# Patient Record
Sex: MALE | Race: WHITE | NOT HISPANIC OR LATINO | Employment: FULL TIME | URBAN - METROPOLITAN AREA
[De-identification: names, ages, dates, MRNs, and addresses within clinical notes are randomized per-mention and may not be internally consistent; named-entity substitution may affect disease eponyms.]

---

## 2017-08-03 ENCOUNTER — HOSPITAL ENCOUNTER (OUTPATIENT)
Facility: MEDICAL CENTER | Age: 37
End: 2017-08-03
Attending: PHYSICIAN ASSISTANT
Payer: COMMERCIAL

## 2017-08-03 LAB
ALBUMIN SERPL BCP-MCNC: 4.7 G/DL (ref 3.2–4.9)
ALP SERPL-CCNC: 84 U/L (ref 30–99)
ALT SERPL-CCNC: 15 U/L (ref 2–50)
AST SERPL-CCNC: 18 U/L (ref 12–45)
BILIRUB CONJ SERPL-MCNC: 0.1 MG/DL (ref 0.1–0.5)
BILIRUB INDIRECT SERPL-MCNC: 0.5 MG/DL (ref 0–1)
BILIRUB SERPL-MCNC: 0.6 MG/DL (ref 0.1–1.5)
FSH SERPL-ACNC: 3.5 MIU/ML (ref 1.5–12.4)
HCT VFR BLD AUTO: 48.1 % (ref 42–52)
HGB BLD-MCNC: 15.8 G/DL (ref 14–18)
LH SERPL-ACNC: 5 IU/L (ref 1.7–8.6)
PROLACTIN SERPL-MCNC: 7.27 NG/ML (ref 2.1–17.7)
PROT SERPL-MCNC: 7.5 G/DL (ref 6–8.2)
TESTOST SERPL-MCNC: 309 NG/DL (ref 175–781)

## 2017-08-03 PROCEDURE — 84146 ASSAY OF PROLACTIN: CPT

## 2017-08-03 PROCEDURE — 80076 HEPATIC FUNCTION PANEL: CPT

## 2017-08-03 PROCEDURE — 84403 ASSAY OF TOTAL TESTOSTERONE: CPT

## 2017-08-03 PROCEDURE — 85018 HEMOGLOBIN: CPT

## 2017-08-03 PROCEDURE — 83001 ASSAY OF GONADOTROPIN (FSH): CPT

## 2017-08-03 PROCEDURE — 83002 ASSAY OF GONADOTROPIN (LH): CPT

## 2017-08-03 PROCEDURE — 85014 HEMATOCRIT: CPT

## 2018-02-05 ENCOUNTER — HOSPITAL ENCOUNTER (OUTPATIENT)
Facility: MEDICAL CENTER | Age: 38
End: 2018-02-05
Attending: PHYSICIAN ASSISTANT
Payer: COMMERCIAL

## 2018-02-05 LAB
ALBUMIN SERPL BCP-MCNC: 4.7 G/DL (ref 3.2–4.9)
ALP SERPL-CCNC: 85 U/L (ref 30–99)
ALT SERPL-CCNC: 19 U/L (ref 2–50)
AST SERPL-CCNC: 21 U/L (ref 12–45)
BILIRUB CONJ SERPL-MCNC: 0.1 MG/DL (ref 0.1–0.5)
BILIRUB INDIRECT SERPL-MCNC: 0.6 MG/DL (ref 0–1)
BILIRUB SERPL-MCNC: 0.7 MG/DL (ref 0.1–1.5)
HCT VFR BLD AUTO: 46.3 % (ref 42–52)
HGB BLD-MCNC: 15.7 G/DL (ref 14–18)
PROT SERPL-MCNC: 7.1 G/DL (ref 6–8.2)
TESTOST SERPL-MCNC: 292 NG/DL (ref 175–781)

## 2018-02-05 PROCEDURE — 84403 ASSAY OF TOTAL TESTOSTERONE: CPT

## 2018-02-05 PROCEDURE — 85018 HEMOGLOBIN: CPT

## 2018-02-05 PROCEDURE — 80076 HEPATIC FUNCTION PANEL: CPT

## 2018-02-05 PROCEDURE — 85014 HEMATOCRIT: CPT

## 2019-11-06 ENCOUNTER — OFFICE VISIT (OUTPATIENT)
Dept: URGENT CARE | Facility: CLINIC | Age: 39
End: 2019-11-06
Payer: COMMERCIAL

## 2019-11-06 VITALS
BODY MASS INDEX: 28.99 KG/M2 | SYSTOLIC BLOOD PRESSURE: 104 MMHG | RESPIRATION RATE: 14 BRPM | DIASTOLIC BLOOD PRESSURE: 78 MMHG | WEIGHT: 214 LBS | TEMPERATURE: 99.6 F | HEIGHT: 72 IN | OXYGEN SATURATION: 96 % | HEART RATE: 66 BPM

## 2019-11-06 DIAGNOSIS — J04.0 LARYNGITIS: ICD-10-CM

## 2019-11-06 LAB
INT CON NEG: NORMAL
INT CON POS: NORMAL
S PYO AG THROAT QL: NEGATIVE

## 2019-11-06 PROCEDURE — 87880 STREP A ASSAY W/OPTIC: CPT | Performed by: PHYSICIAN ASSISTANT

## 2019-11-06 PROCEDURE — 99203 OFFICE O/P NEW LOW 30 MIN: CPT | Performed by: PHYSICIAN ASSISTANT

## 2019-11-06 RX ORDER — AZITHROMYCIN 250 MG/1
TABLET, FILM COATED ORAL
Qty: 6 TAB | Refills: 0 | Status: SHIPPED | OUTPATIENT
Start: 2019-11-06

## 2019-11-06 ASSESSMENT — ENCOUNTER SYMPTOMS
DIZZINESS: 0
ABDOMINAL PAIN: 0
VOMITING: 0
BLURRED VISION: 0
NAUSEA: 0
DIARRHEA: 0
TROUBLE SWALLOWING: 1
CHILLS: 0
EYE PAIN: 0
SWOLLEN GLANDS: 1
CONSTIPATION: 0
SORE THROAT: 1
FEVER: 0
HOARSE VOICE: 1
HEADACHES: 0
PALPITATIONS: 0
MYALGIAS: 0
SHORTNESS OF BREATH: 0
COUGH: 1

## 2019-11-06 NOTE — PROGRESS NOTES
Subjective:      Torres Stuart is a 39 y.o. male who presents with Laryngitis and Pharyngitis      Pharyngitis    This is a new problem. The current episode started in the past 7 days (Started 7 days ago). The problem has been gradually worsening. Neither side of throat is experiencing more pain than the other. There has been no fever. The pain is moderate. Associated symptoms include coughing, a hoarse voice, swollen glands and trouble swallowing. Pertinent negatives include no abdominal pain, congestion, diarrhea, ear discharge, headaches, plugged ear sensation, shortness of breath or vomiting. He has had no exposure to strep or mono. He has tried NSAIDs, acetaminophen and cool liquids (Throat lozenges) for the symptoms. The treatment provided mild relief.       Review of Systems   Constitutional: Positive for malaise/fatigue. Negative for chills and fever.   HENT: Positive for hoarse voice, sore throat and trouble swallowing. Negative for congestion and ear discharge.    Eyes: Negative for blurred vision and pain.   Respiratory: Positive for cough. Negative for shortness of breath.    Cardiovascular: Negative for chest pain and palpitations.   Gastrointestinal: Negative for abdominal pain, constipation, diarrhea, nausea and vomiting.   Musculoskeletal: Negative for myalgias.   Skin: Negative for rash.   Neurological: Negative for dizziness and headaches.       PMH:  has a past medical history of Heart murmur.  MEDS:   Current Outpatient Medications:   •  maalox plus-benadryl-visc lidocaine (MAGIC MOUTHWASH), Take 5 mL by mouth every 6 hours as needed., Disp: 120 mL, Rfl: 0  •  azithromycin (ZITHROMAX) 250 MG Tab, Take two tabs po day one followed by one tab po day two through five with food, Disp: 6 Tab, Rfl: 0  ALLERGIES: No Known Allergies  SURGHX:   Past Surgical History:   Procedure Laterality Date   • TONSILLECTOMY       SOCHX:  reports that he has never smoked. He has never used smokeless tobacco. He  reports current alcohol use of about 2.5 oz of alcohol per week. He reports that he does not use drugs.  FH: Family history was reviewed, no pertinent findings to report     Objective:     /78 (BP Location: Right arm, Patient Position: Sitting)   Pulse 66   Temp 37.6 °C (99.6 °F)   Resp 14   Ht 1.829 m (6')   Wt 97.1 kg (214 lb)   SpO2 96%   BMI 29.02 kg/m²      Physical Exam  Vitals signs and nursing note reviewed.   Constitutional:       Appearance: He is well-developed.   HENT:      Head: Normocephalic and atraumatic.      Right Ear: Tympanic membrane, ear canal and external ear normal.      Left Ear: Tympanic membrane, ear canal and external ear normal.      Nose: Nose normal.      Mouth/Throat:      Pharynx: Oropharynx is clear. No posterior oropharyngeal erythema.      Comments: Tonsils are surgically absent  Eyes:      Conjunctiva/sclera: Conjunctivae normal.      Pupils: Pupils are equal, round, and reactive to light.   Neck:      Musculoskeletal: Normal range of motion.   Cardiovascular:      Rate and Rhythm: Normal rate and regular rhythm.      Heart sounds: Normal heart sounds. No murmur.   Pulmonary:      Effort: Pulmonary effort is normal.      Breath sounds: Normal breath sounds. No wheezing.   Lymphadenopathy:      Cervical: Cervical adenopathy present.      Right cervical: Superficial cervical adenopathy present.      Left cervical: Superficial cervical adenopathy present.   Skin:     General: Skin is warm and dry.      Capillary Refill: Capillary refill takes less than 2 seconds.   Neurological:      Mental Status: He is alert and oriented to person, place, and time.   Psychiatric:         Behavior: Behavior normal.         Judgment: Judgment normal.         POCT Rapid Strep A - Negative     Assessment/Plan:     1. Laryngitis  - POCT Rapid Strep A  - maalox plus-benadryl-visc lidocaine (MAGIC MOUTHWASH); Take 5 mL by mouth every 6 hours as needed.  Dispense: 120 mL; Refill: 0  -  azithromycin (ZITHROMAX) 250 MG Tab; Take two tabs po day one followed by one tab po day two through five with food  Dispense: 6 Tab; Refill: 0            Differential Diagnosis, natural history, and supportive care discussed. Return to the Urgent Care or follow up with your PCP if symptoms fail to resolve, or for any new or worsening symptoms. Emergency room precautions discussed. Patient and/or family appears understanding of information.

## 2019-11-06 NOTE — LETTER
November 6, 2019         Patient: Torres Stuart   YOB: 1980   Date of Visit: 11/6/2019           To Whom it May Concern:    Torres Stuart was seen in my clinic on 11/6/2019.     If you have any questions or concerns, please don't hesitate to call.        Sincerely,           Ela Mcfarlane P.A.-C.  Electronically Signed

## 2021-05-31 ENCOUNTER — HOSPITAL ENCOUNTER (EMERGENCY)
Facility: MEDICAL CENTER | Age: 41
End: 2021-05-31
Attending: EMERGENCY MEDICINE
Payer: COMMERCIAL

## 2021-05-31 ENCOUNTER — APPOINTMENT (OUTPATIENT)
Dept: RADIOLOGY | Facility: MEDICAL CENTER | Age: 41
End: 2021-05-31
Attending: EMERGENCY MEDICINE
Payer: COMMERCIAL

## 2021-05-31 VITALS
BODY MASS INDEX: 29.41 KG/M2 | RESPIRATION RATE: 18 BRPM | HEART RATE: 67 BPM | DIASTOLIC BLOOD PRESSURE: 72 MMHG | TEMPERATURE: 98.4 F | HEIGHT: 72 IN | WEIGHT: 217.15 LBS | OXYGEN SATURATION: 93 % | SYSTOLIC BLOOD PRESSURE: 118 MMHG

## 2021-05-31 DIAGNOSIS — J12.82 PNEUMONIA DUE TO COVID-19 VIRUS: Primary | ICD-10-CM

## 2021-05-31 DIAGNOSIS — U07.1 PNEUMONIA DUE TO COVID-19 VIRUS: Primary | ICD-10-CM

## 2021-05-31 LAB
ALBUMIN SERPL BCP-MCNC: 4.2 G/DL (ref 3.2–4.9)
ALBUMIN/GLOB SERPL: 1.4 G/DL
ALP SERPL-CCNC: 96 U/L (ref 30–99)
ALT SERPL-CCNC: 20 U/L (ref 2–50)
ANION GAP SERPL CALC-SCNC: 15 MMOL/L (ref 7–16)
AST SERPL-CCNC: 27 U/L (ref 12–45)
BASOPHILS # BLD AUTO: 0 % (ref 0–1.8)
BASOPHILS # BLD: 0 K/UL (ref 0–0.12)
BILIRUB SERPL-MCNC: 0.4 MG/DL (ref 0.1–1.5)
BUN SERPL-MCNC: 15 MG/DL (ref 8–22)
CALCIUM SERPL-MCNC: 8.8 MG/DL (ref 8.4–10.2)
CHLORIDE SERPL-SCNC: 98 MMOL/L (ref 96–112)
CO2 SERPL-SCNC: 24 MMOL/L (ref 20–33)
CREAT SERPL-MCNC: 1.23 MG/DL (ref 0.5–1.4)
D DIMER PPP IA.FEU-MCNC: 0.44 UG/ML (FEU) (ref 0–0.5)
EKG IMPRESSION: NORMAL
EOSINOPHIL # BLD AUTO: 0 K/UL (ref 0–0.51)
EOSINOPHIL NFR BLD: 0 % (ref 0–6.9)
ERYTHROCYTE [DISTWIDTH] IN BLOOD BY AUTOMATED COUNT: 38.6 FL (ref 35.9–50)
FLUAV RNA SPEC QL NAA+PROBE: NEGATIVE
FLUBV RNA SPEC QL NAA+PROBE: NEGATIVE
GLOBULIN SER CALC-MCNC: 2.9 G/DL (ref 1.9–3.5)
GLUCOSE SERPL-MCNC: 96 MG/DL (ref 65–99)
HCT VFR BLD AUTO: 42.4 % (ref 42–52)
HGB BLD-MCNC: 14.5 G/DL (ref 14–18)
IMM GRANULOCYTES # BLD AUTO: 0.02 K/UL (ref 0–0.11)
IMM GRANULOCYTES NFR BLD AUTO: 0.6 % (ref 0–0.9)
LYMPHOCYTES # BLD AUTO: 1.04 K/UL (ref 1–4.8)
LYMPHOCYTES NFR BLD: 32.7 % (ref 22–41)
MCH RBC QN AUTO: 29.5 PG (ref 27–33)
MCHC RBC AUTO-ENTMCNC: 34.2 G/DL (ref 33.7–35.3)
MCV RBC AUTO: 86.4 FL (ref 81.4–97.8)
MONOCYTES # BLD AUTO: 0.34 K/UL (ref 0–0.85)
MONOCYTES NFR BLD AUTO: 10.7 % (ref 0–13.4)
NEUTROPHILS # BLD AUTO: 1.78 K/UL (ref 1.82–7.42)
NEUTROPHILS NFR BLD: 56 % (ref 44–72)
NRBC # BLD AUTO: 0 K/UL
NRBC BLD-RTO: 0 /100 WBC
NT-PROBNP SERPL IA-MCNC: 20 PG/ML (ref 0–125)
PLATELET # BLD AUTO: 173 K/UL (ref 164–446)
PMV BLD AUTO: 9.1 FL (ref 9–12.9)
POTASSIUM SERPL-SCNC: 4 MMOL/L (ref 3.6–5.5)
PROT SERPL-MCNC: 7.1 G/DL (ref 6–8.2)
RBC # BLD AUTO: 4.91 M/UL (ref 4.7–6.1)
RSV RNA SPEC QL NAA+PROBE: NEGATIVE
SARS-COV-2 RNA RESP QL NAA+PROBE: DETECTED
SODIUM SERPL-SCNC: 137 MMOL/L (ref 135–145)
SPECIMEN SOURCE: ABNORMAL
TROPONIN T SERPL-MCNC: <6 NG/L (ref 6–19)
WBC # BLD AUTO: 3.2 K/UL (ref 4.8–10.8)

## 2021-05-31 PROCEDURE — 93005 ELECTROCARDIOGRAM TRACING: CPT | Performed by: EMERGENCY MEDICINE

## 2021-05-31 PROCEDURE — 0241U HCHG SARS-COV-2 COVID-19 NFCT DS RESP RNA 4 TRGT MIC: CPT

## 2021-05-31 PROCEDURE — 85025 COMPLETE CBC W/AUTO DIFF WBC: CPT

## 2021-05-31 PROCEDURE — 80053 COMPREHEN METABOLIC PANEL: CPT

## 2021-05-31 PROCEDURE — 36415 COLL VENOUS BLD VENIPUNCTURE: CPT

## 2021-05-31 PROCEDURE — 83880 ASSAY OF NATRIURETIC PEPTIDE: CPT

## 2021-05-31 PROCEDURE — C9803 HOPD COVID-19 SPEC COLLECT: HCPCS | Performed by: EMERGENCY MEDICINE

## 2021-05-31 PROCEDURE — 99283 EMERGENCY DEPT VISIT LOW MDM: CPT

## 2021-05-31 PROCEDURE — 71046 X-RAY EXAM CHEST 2 VIEWS: CPT

## 2021-05-31 PROCEDURE — 85379 FIBRIN DEGRADATION QUANT: CPT

## 2021-05-31 PROCEDURE — 84484 ASSAY OF TROPONIN QUANT: CPT

## 2021-06-01 NOTE — ED PROVIDER NOTES
ED Provider Note     5/31/2021  5:42 PM    Means of Arrival: Walk In  History obtained by: patient  Limitations: none  PCP: Belia Griffiths  CODE STATUS: Full    CHIEF COMPLAINT  Chief Complaint   Patient presents with   • Shortness of Breath     Reports SOB, decreased appetite, and generalised weakness x approx 10 days.    • Chest Pain     Reports sensation of burning to sternum.        HPI  Torres Stuart is a 40 y.o. male history of mitral valve prolapse who presents with concerns of shortness of breath for the past 10 days.  He says that shortness of breath is worse when he exerts himself but he does not have to stop to catch his breath.  He is not noticed any leg swelling.  He has had mild burning sensation in the retrosternal area.  No radiating pain symptoms.  His appetite has been decreased but he has been tolerating fluids.  Also endorses generalized fatigue.  He takes no medications.  He does not smoke.  He exercises regularly but has not for the past 10 days.    REVIEW OF SYSTEMS  Review of Systems   All other systems reviewed and are negative.    See HPI for further details.    PAST MEDICAL HISTORY   has a past medical history of Heart murmur.    SOCIAL HISTORY  Social History     Tobacco Use   • Smoking status: Never Smoker   • Smokeless tobacco: Never Used   Vaping Use   • Vaping Use: Never used   Substance and Sexual Activity   • Alcohol use: Yes     Alcohol/week: 2.5 oz     Types: 5 Cans of beer per week   • Drug use: No   • Sexual activity: Yes     Partners: Female       SURGICAL HISTORY   has a past surgical history that includes tonsillectomy and vasectomy.    CURRENT MEDICATIONS  Home Medications    **Home medications have not yet been reviewed for this encounter**         ALLERGIES  Allergies   Allergen Reactions   • Food      gluten       PHYSICAL EXAM  VITAL SIGNS: /72   Pulse 67   Temp 36.9 °C (98.4 °F) (Temporal)   Resp 18   Ht 1.829 m (6')   Wt 98.5 kg (217 lb 2.5 oz)    SpO2 93%   BMI 29.45 kg/m²     Pulse ox interpretation:  interpret this pulse ox as normal.  Constitutional: Well-nourished healthy-appearing 40-year-old man.  HENT: No signs of trauma, Bilateral external ears normal, Nose normal.   Eyes: Pupils are equal, Conjunctiva normal, Non-icteric.   Neck: Normal range of motion, No tenderness, Supple, No stridor.  No JVD.  Cardiovascular: Regular rate and rhythm, no murmurs. Symmetric distal pulses. No cyanosis of extremities. No peripheral edema of extremities.  Thorax & Lungs: Slightly increased respiratory rate, faint crackles at the left lower lung field, no retractions.  Abdomen:  Soft, No tenderness, No masses, No pulsatile masses. No peritoneal signs.  Skin: Warm, Dry, No erythema, No rash.   Back: No midline bony tenderness, No CVA tenderness.   Musculoskeletal: Good range of motion in all major joints. No tenderness to palpation or major deformities noted.   Neurologic: Alert , Normal motor function, Normal sensory function, No focal deficits noted.   Psychiatric: Affect normal, Judgment normal, Mood normal.   Physical Exam      DIAGNOSTIC STUDIES / PROCEDURES    EKG  Results for orders placed or performed during the hospital encounter of 21   EKG   Result Value Ref Range    Report       [facility] Emergency Dept.    Test Date:  2021  Pt Name:    MEGHNA DENNIS         Department: [facility]  MRN:        [id number]                      Room:  Gender:     Male                         Technician: TCM  :        1980-10-                   Requested By:ER TRIAGE PROTOCOL  Order #:    [id number]                    Reading MD: Efrain Mendosa II, MD    Measurements  Intervals                                Axis  Rate:       61                           P:          71  VA:         192                          QRS:        85  QRSD:       107                          T:          56  QT:         417  QTc:        420    Interpretive  Statements  Sinus rhythm  Rate 61  Normal intervals  No ST elevation or depression  Impression: Normal sinus rhythm EKG  No previous ECG available for comparison  Electronically Signed On 5- 20:31:27 PDT by Efrain Mendosa II, MD           LABS  Pertinent Labs & Imaging studies reviewed. (See chart for details)    RADIOLOGY  Pertinent Labs & Imaging studies reviewed. (See chart for details)    COURSE & MEDICAL DECISION MAKING  Pertinent Labs & Imaging studies reviewed. (See chart for details)    5:42 PM This is an emergent evaluation of a  40 y.o. male who presents with feeling of shortness of breath of the several days.  Pain when taking deep breath.  Vitals normal.  Will evaluate for possible pneumonia, PE, MI, CHF.    I have ordered continuous pulse ox and cardiac monitoring. Pulse ox shows a normal wave form with normal saturations 98%. Cardiac monitors show a normal sinus rhythm 70s with intermittent PVCs.     8:29 PM  X-ray showed infiltrates at the left lung fields.  He is young age and otherwise healthy status I felt like this was not bacterial pneumonia.  He also had a low white count which is more suggestive of viral infection.  Flu and COVID-19 testing was done once all the tests came back unremarkable.  He has not yet been vaccinated..  COVID-19 test did return positive.  I reviewed all the results with him.  He is safe for discharge given his normal saturations, normal heart rate and otherwise well appearance.  Given instructions on COVID-19 and information on preventing spread.     The patient will return for worsening symptoms and is stable at the time of discharge. The patient verbalizes understanding. Guidance was provided on appropriate use of medications including driving under the influence, overdose, and side effects.         FINAL IMPRESSION    ICD-10-CM   1. Pneumonia due to COVID-19 virus Active U07.1    J12.82            This dictation was created using voice recognition software.  The accuracy of the dictation is limited to the abilities of the software. I expect there may be some errors of grammar and possibly content. The nursing notes were reviewed and certain aspects of this information were incorporated into this note.    Electronically signed by: Efrain Mendosa II, M.D., 5/31/2021 5:42 PM

## 2023-11-08 ENCOUNTER — HOSPITAL ENCOUNTER (OUTPATIENT)
Facility: MEDICAL CENTER | Age: 43
End: 2023-11-08
Attending: PHYSICIAN ASSISTANT
Payer: COMMERCIAL

## 2023-11-09 LAB
FORWARD REASON: SPWHY: NORMAL
FORWARDED TO LAB: SPWHR: NORMAL
SPECIMEN SENT: SPWT1: NORMAL